# Patient Record
Sex: MALE | Race: WHITE | NOT HISPANIC OR LATINO | Employment: OTHER | URBAN - NONMETROPOLITAN AREA
[De-identification: names, ages, dates, MRNs, and addresses within clinical notes are randomized per-mention and may not be internally consistent; named-entity substitution may affect disease eponyms.]

---

## 2019-10-11 ENCOUNTER — HOSPITAL ENCOUNTER (EMERGENCY)
Facility: HOSPITAL | Age: 44
Discharge: HOME | End: 2019-10-11
Attending: FAMILY MEDICINE | Admitting: EMERGENCY MEDICINE

## 2019-10-11 VITALS
TEMPERATURE: 98.7 F | DIASTOLIC BLOOD PRESSURE: 87 MMHG | HEIGHT: 71 IN | WEIGHT: 230 LBS | BODY MASS INDEX: 32.2 KG/M2 | HEART RATE: 112 BPM | OXYGEN SATURATION: 96 % | SYSTOLIC BLOOD PRESSURE: 144 MMHG | RESPIRATION RATE: 22 BRPM

## 2019-10-11 DIAGNOSIS — F32.9 MAJOR DEPRESSIVE DISORDER, REMISSION STATUS UNSPECIFIED, UNSPECIFIED WHETHER RECURRENT: Primary | ICD-10-CM

## 2019-10-11 PROCEDURE — 99284 EMERGENCY DEPT VISIT MOD MDM: CPT

## 2019-10-11 NOTE — NURSING NOTE
Spoke with Dr. Mccloud, presented pt to him. Order to discharge pt to outpatient treatment. Carlos CARVALHO made aware.

## 2019-10-11 NOTE — ED PROVIDER NOTES
"Subjective   The patient is a 43 year old male that presents to the ED for psychiatric evaluation. The patient is difficult to evaluate because he is exhibiting flight of ideas. He says \" I'm crazy! My dog  and I hate everybody. I'm not suicidal though\"        History provided by:  Patient   used: No    Psychiatric Evaluation   Severity:  Moderate  Onset quality:  Gradual  Timing:  Intermittent  Progression:  Waxing and waning  Chronicity:  New  Associated symptoms: no congestion, no cough, no diarrhea, no ear pain, no rash, no rhinorrhea, no shortness of breath and no sore throat        Review of Systems   Constitutional: Negative.    HENT: Negative for congestion, ear pain, rhinorrhea and sore throat.    Eyes: Negative.    Respiratory: Negative.  Negative for cough and shortness of breath.    Cardiovascular: Negative.    Gastrointestinal: Negative.  Negative for diarrhea.   Endocrine: Negative.    Genitourinary: Negative.    Musculoskeletal: Negative.    Skin: Negative.  Negative for rash.   Allergic/Immunologic: Negative.    Neurological: Negative.    Hematological: Negative.    Psychiatric/Behavioral: Negative.    All other systems reviewed and are negative.      Past Medical History:   Diagnosis Date   • Chronic pain disorder    • PTSD (post-traumatic stress disorder)        Allergies   Allergen Reactions   • Solu-Medrol [Methylprednisolone] Mental Status Change       Past Surgical History:   Procedure Laterality Date   • BACK SURGERY         No family history on file.    Social History     Socioeconomic History   • Marital status:      Spouse name: Not on file   • Number of children: Not on file   • Years of education: Not on file   • Highest education level: Not on file   Tobacco Use   • Smoking status: Former Smoker     Packs/day: 1.00     Years: 21.00     Pack years: 21.00     Types: Cigarettes   • Smokeless tobacco: Former User   Substance and Sexual Activity   • Alcohol use: " Yes     Comment: amount varies   • Drug use: Yes     Types: Marijuana   • Sexual activity: Yes     Partners: Female           Objective   Physical Exam   Constitutional: He is oriented to person, place, and time. He appears well-developed and well-nourished.   HENT:   Head: Normocephalic.   Eyes: EOM are normal. Pupils are equal, round, and reactive to light.   Neck: Normal range of motion. Neck supple.   Cardiovascular: Normal rate, regular rhythm, normal heart sounds and intact distal pulses.   Pulmonary/Chest: Effort normal and breath sounds normal.   Abdominal: Soft. Bowel sounds are normal.   Musculoskeletal: Normal range of motion.   Neurological: He is alert and oriented to person, place, and time.   Skin: Skin is warm. Capillary refill takes less than 2 seconds.   Psychiatric: His mood appears anxious. His affect is angry. His speech is rapid and/or pressured. He is agitated and hyperactive. Cognition and memory are normal. He expresses impulsivity. He exhibits a depressed mood. He expresses no suicidal ideation. He expresses no suicidal plans.   Patient pacing up and down hallway He is inattentive.   Nursing note and vitals reviewed.      Procedures           ED Course                  MDM  Number of Diagnoses or Management Options  Major depressive disorder, remission status unspecified, unspecified whether recurrent: new and does not require workup  Risk of Complications, Morbidity, and/or Mortality  Presenting problems: low  Diagnostic procedures: low  Management options: low    Patient Progress  Patient progress: stable      Final diagnoses:   Major depressive disorder, remission status unspecified, unspecified whether recurrent              Kate Boothe, APRN  10/11/19 4408
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
## 2019-10-11 NOTE — DISCHARGE INSTRUCTIONS
Call one of the offices below to establish a primary care provider.  If you are unable to get an appointment and feel it is an emergency and need to be seen immediately please return to the Emergency Department.    Call one of the office below to set up a primary care provider.    Dr. Esau Mckinney                                                                                                       [address] [facility] [address]  [phone]    Dr. Griffith, Dr. TRANG Gasca, Dr. FRANK Gasca ([facility])  [address] [facility] [address]  [phone]    Dr. Torres, Dr. Crowe, Dr. Ga ([facility])  [address] [facility] [address]  [phone]    Dr. Zamorano  [address] [facility] [address]  [phone]    Dr. Hernandez, Dr. Taylor, Dr. Bernal, Dr. Rivera ([facility])  [address] [place] [address] [address]  [facility] [address]  [phone]    Dr. Sandrita Padilla  [address] [facility] [place] [address]  [phone]    Dr. Carlota Herrera  [address]   [address]  [facility] [address]  [phone]    Dr. Mckinney  [address] [facility] [address]  [phone]    Dr. Borja, Dr. Yao  [address] [facility] [place] [address]  [phone]    Dr. Gaspar  [address]                                                              [address]  [facility] [address]  [phone]    Dr. Islas  [address] [facility] [id number] [phone]    Dr. Anne Marie Escobedo  [address]  [address]  [facility] [address]  [phone]